# Patient Record
Sex: FEMALE | Race: WHITE
[De-identification: names, ages, dates, MRNs, and addresses within clinical notes are randomized per-mention and may not be internally consistent; named-entity substitution may affect disease eponyms.]

---

## 2017-12-13 ENCOUNTER — HOSPITAL ENCOUNTER (OUTPATIENT)
Dept: HOSPITAL 17 - HPND | Age: 32
End: 2017-12-13
Attending: OBSTETRICS & GYNECOLOGY
Payer: MEDICAID

## 2017-12-13 DIAGNOSIS — O99.212: Primary | ICD-10-CM

## 2017-12-13 DIAGNOSIS — E66.01: ICD-10-CM

## 2017-12-13 DIAGNOSIS — O24.410: ICD-10-CM

## 2017-12-13 DIAGNOSIS — O10.012: ICD-10-CM

## 2017-12-13 PROCEDURE — 76825 ECHO EXAM OF FETAL HEART: CPT

## 2017-12-13 PROCEDURE — 76811 OB US DETAILED SNGL FETUS: CPT

## 2017-12-13 PROCEDURE — 93325 DOPPLER ECHO COLOR FLOW MAPG: CPT

## 2017-12-13 PROCEDURE — 76827 ECHO EXAM OF FETAL HEART: CPT

## 2018-01-09 ENCOUNTER — HOSPITAL ENCOUNTER (OUTPATIENT)
Dept: HOSPITAL 17 - HCAV | Age: 33
End: 2018-01-09
Attending: OBSTETRICS & GYNECOLOGY
Payer: MEDICAID

## 2018-01-09 DIAGNOSIS — O24.919: Primary | ICD-10-CM

## 2018-01-09 PROCEDURE — 93005 ELECTROCARDIOGRAM TRACING: CPT

## 2018-01-09 NOTE — EKG
Date Performed: 01/09/2018       Time Performed: 15:08:34

 

PTAGE:      32 years

 

EKG:      Sinus rhythm 

 

 NORMAL ECG 

 

NO PREVIOUS TRACING            

 

DOCTOR:   aBy Duran  Interpretating Date/Time  01/09/2018 17:03:28

## 2018-01-11 ENCOUNTER — HOSPITAL ENCOUNTER (OUTPATIENT)
Dept: HOSPITAL 17 - HPND | Age: 33
End: 2018-01-11
Attending: OBSTETRICS & GYNECOLOGY
Payer: MEDICAID

## 2018-01-11 DIAGNOSIS — O24.410: ICD-10-CM

## 2018-01-11 DIAGNOSIS — O10.012: Primary | ICD-10-CM

## 2018-01-11 DIAGNOSIS — O99.212: ICD-10-CM

## 2018-01-11 PROCEDURE — 76816 OB US FOLLOW-UP PER FETUS: CPT
